# Patient Record
(demographics unavailable — no encounter records)

---
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
## 2017-12-11 NOTE — EMERGENCY DEPARTMENT REPORT
HPI





- General


Chief Complaint: Psych


Time Seen by Provider: 12/10/17 00:27





- HPI


HPI: 


Patient has been cleared by psych.  Psych as rescinded 1013.  Patient is now 

stable from a psychiatric stand point to be discharged home.  Will discharge 

patient home. 








ED Past Medical Hx





- Past Medical History


Previous Medical History?: Yes


Hx Psychiatric Treatment: Yes (Bipolar, Schizophrenia, Anxiety, Depression)


Additional medical history: Sciatica, Gastroparesis





- Surgical History


Hx Cholecystectomy: Yes





- Family History


Family history: no significant





- Social History


Smoking Status: Never Smoker


Substance Use Type: None





- Medications


Home Medications: 


 Home Medications











 Medication  Instructions  Recorded  Confirmed  Last Taken  Type


 


Ambien 10 mg PO HS 12/09/17 12/09/17 Unknown History


 


Lexapro 10 mg PO DAILY 12/09/17 12/09/17 Unknown History


 


Xanax TAB 2 mg PO TID 12/09/17 12/09/17 Unknown History


 


ZyPREXA 10 mg PO DAILY 12/09/17 12/09/17 Unknown History














ED Review of Systems


ROS: 


Stated complaint: MH; N/V


Other details as noted in HPI





Comment: All other systems reviewed and negative


Constitutional: denies: chills, fever


Respiratory: denies: cough, orthopnea, shortness of breath, SOB with exertion


Cardiovascular: denies: chest pain, palpitations, dyspnea on exertion, orthopnea

, edema


Gastrointestinal: denies: abdominal pain, nausea, vomiting, diarrhea, 

constipation, hematemesis


Musculoskeletal: denies: back pain


Skin: denies: rash


Neurological: denies: headache, weakness, numbness, paresthesias, confusion


Psychiatric: depression, suicidal thoughts.  denies: auditory hallucinations, 

visual hallucinations, homicidal thoughts





Physical Exam





- Physical Exam


Vital Signs: 


 Vital Signs











  12/09/17 12/09/17 12/10/17





  22:24 22:54 04:00


 


Temperature 98.9 F 98.9 F 


 


Pulse Rate 65 70 


 


Respiratory 18 16 18





Rate   


 


Blood Pressure 103/65 103/65 


 


Blood Pressure   





[Right]   


 


O2 Sat by Pulse 100 100 98





Oximetry   














  12/10/17 12/10/17 12/10/17





  08:15 08:30 20:15


 


Temperature 98.9 F  


 


Pulse Rate 83  


 


Respiratory 16 16 18





Rate   


 


Blood Pressure   


 


Blood Pressure 101/67  





[Right]   


 


O2 Sat by Pulse 100 100 99





Oximetry   














  12/10/17 12/10/17





  21:50 21:57


 


Temperature  98.7 F


 


Pulse Rate  77


 


Respiratory 16 16





Rate  


 


Blood Pressure  


 


Blood Pressure  96/54





[Right]  


 


O2 Sat by Pulse 99 97





Oximetry  














ED Course


 Vital Signs











  12/09/17 12/09/17 12/10/17





  22:24 22:54 04:00


 


Temperature 98.9 F 98.9 F 


 


Pulse Rate 65 70 


 


Respiratory 18 16 18





Rate   


 


Blood Pressure 103/65 103/65 


 


Blood Pressure   





[Right]   


 


O2 Sat by Pulse 100 100 98





Oximetry   














  12/10/17 12/10/17 12/10/17





  08:15 08:30 20:15


 


Temperature 98.9 F  


 


Pulse Rate 83  


 


Respiratory 16 16 18





Rate   


 


Blood Pressure   


 


Blood Pressure 101/67  





[Right]   


 


O2 Sat by Pulse 100 100 99





Oximetry   














  12/10/17 12/10/17





  21:50 21:57


 


Temperature  98.7 F


 


Pulse Rate  77


 


Respiratory 16 16





Rate  


 


Blood Pressure  


 


Blood Pressure  96/54





[Right]  


 


O2 Sat by Pulse 99 97





Oximetry  














ED Medical Decision Making





- Lab Data


Result diagrams: 


 12/09/17 23:15





 12/09/17 23:15


Critical care attestation.: 


If time is entered above; I have spent that time in minutes in the direct care 

of this critically ill patient, excluding procedure time.








ED Disposition


Clinical Impression: 


 Depression





Disposition: DC-01 TO HOME OR SELFCARE


Is pt being admited?: No


Does the pt Need Aspirin: No


Condition: Stable


Instructions:  Depression (ED)


Additional Instructions: 


Patient sees psychiatry within 3-5 days.  Patient to see PCP in 3-5 days,.  

Patient to return to ER if condition worsens. 


Referrals: 


PRIMARY CARE,MD [Primary Care Provider] - 3-5 Days


Time of Disposition: 01:16

## 2018-01-20 NOTE — EMERGENCY DEPARTMENT REPORT
- General


Chief complaint: Skin/Abscess/Foreign Body


Stated complaint: BOIL


Time Seen by Provider: 01/20/18 10:15


Source: patient


Mode of arrival: Ambulatory


Limitations: No Limitations





- History of Present Illness


Initial comments: 





33-year-old past medical history bipolar disorder, schizophrenia, gastroparesis

, previous cholecystectomy presents also complains of a painful boil to her 

vaginal area for the past 5 days.  Patient was seen at [facility] [place] ago for the 

same and it was the size of a seat at the time.  No antibiotic were prescribed.

  Patient was diagnosed with yeast infection.  Since then pain has continued 

and is described as moderate to severe in intensity, constant, worse palpation 

and sitting.  No relieving factors reported.  Positive fever 103 at home.  

Positive nausea, vomiting or by mouth intolerance for the past 5 days.  No 

complaints of abdominal pain or diarrhea.  Patient also has a complaint of 

intermittent sternal "snatching" chest pain 2 days with mild shortness of 

breath





- Related Data


 Home Medications











 Medication  Instructions  Recorded  Confirmed  Last Taken


 


Ambien 10 mg PO HS 12/09/17 12/09/17 Unknown


 


Lexapro 10 mg PO DAILY 12/09/17 12/09/17 Unknown


 


Xanax TAB 2 mg PO TID 12/09/17 12/09/17 Unknown


 


ZyPREXA 10 mg PO DAILY 12/09/17 12/09/17 Unknown








 Previous Rx's











 Medication  Instructions  Recorded  Last Taken  Type


 


Doxycycline [Vibramycin CAP] 100 mg PO Q12HR #20 capsule 01/20/18 Unknown Rx


 


HYDROcodone/APAP 5-325 [[place] 1 each PO Q6HR PRN #15 tablet 01/20/18 Unknown Rx





5/325]    


 


Promethazine [Phenergan TAB] 25 mg PO Q6HR PRN #20 tab 01/20/18 Unknown Rx











 Allergies











Allergy/AdvReac Type Severity Reaction Status Date / Time


 


ketorolac [From Toradol] Allergy Unknown Unknown Verified 01/20/18 11:25


 


ondansetron Allergy Unknown Unknown Verified 01/20/18 11:26





[From Zofran (as     





hydrochloride)]     














Abscess Boil HPI





- HPI


Chief Complaint: Skin/Abscess/Foreign Body


Stated Complaint: BOIL


Time Seen by Provider: 01/20/18 10:15


Home Medications: 


 Home Medications











 Medication  Instructions  Recorded  Confirmed  Last Taken


 


Ambien 10 mg PO HS 12/09/17 12/09/17 Unknown


 


Lexapro 10 mg PO DAILY 12/09/17 12/09/17 Unknown


 


Xanax TAB 2 mg PO TID 12/09/17 12/09/17 Unknown


 


ZyPREXA 10 mg PO DAILY 12/09/17 12/09/17 Unknown








 Previous Rx's











 Medication  Instructions  Recorded  Last Taken  Type


 


Doxycycline [Vibramycin CAP] 100 mg PO Q12HR #20 capsule 01/20/18 Unknown Rx


 


HYDROcodone/APAP 5-325 [[place] 1 each PO Q6HR PRN #15 tablet 01/20/18 Unknown Rx





5/325]    


 


Promethazine [Phenergan TAB] 25 mg PO Q6HR PRN #20 tab 01/20/18 Unknown Rx











Allergies/Adverse Reactions: 


 Allergies











Allergy/AdvReac Type Severity Reaction Status Date / Time


 


ketorolac [From Toradol] Allergy Unknown Unknown Verified 01/20/18 11:25


 


ondansetron Allergy Unknown Unknown Verified 01/20/18 11:26





[From Zofran (as     





hydrochloride)]     














ED Review of Systems


ROS: 


Stated complaint: BOIL


Other details as noted in HPI





Comment: All other systems reviewed and negative


Other: 





Constitutional: No fevers chills 


Eyes: No eye pain visual changes 


ENT: No ear pain or throat pain


Neck: Denies pain


Respiratory: Denies cough wheezing


Cardiovascular: Denies  palpitations, syncope


GI: As per HPI


: Denies dysuria


Musculoskeletal: Denies back pain, joint swelling 


Skin: Denies rash, lesions, erythema


Neurologic: Denies headache, numbness, weakness


Psychiatric: Denies suicidal ideation, hallucinations








ED Past Medical Hx





- Past Medical History


Hx Psychiatric Treatment: Yes (Bipolar, Schizophrenia, Anxiety, Depression)


Additional medical history: Sciatica, Gastroparesis





- Surgical History


Hx Cholecystectomy: Yes





- Social History


Smoking Status: Never Smoker





- Medications


Home Medications: 


 Home Medications











 Medication  Instructions  Recorded  Confirmed  Last Taken  Type


 


Ambien 10 mg PO HS 12/09/17 12/09/17 Unknown History


 


Lexapro 10 mg PO DAILY 12/09/17 12/09/17 Unknown History


 


Xanax TAB 2 mg PO TID 12/09/17 12/09/17 Unknown History


 


ZyPREXA 10 mg PO DAILY 12/09/17 12/09/17 Unknown History


 


Doxycycline [Vibramycin CAP] 100 mg PO Q12HR #20 capsule 01/20/18  Unknown Rx


 


HYDROcodone/APAP 5-325 [[place] 1 each PO Q6HR PRN #15 tablet 01/20/18  Unknown Rx





5/325]     


 


Promethazine [Phenergan TAB] 25 mg PO Q6HR PRN #20 tab 01/20/18  Unknown Rx














ED Physical Exam





- General


Limitations: No Limitations





- Other


Other exam information: 





General: No limitations, patient is alert in no acute distress


Head exam: Atraumatic, normocephalic


Eyes exam: Normal appearance


ENT: Moist mucous membrane, normal oropharynx


Neck exam: Normal inspection, full range of motion, no meningismus nontender


Respiratory exam: Clear to auscultation bilateral, no wheezes, rales, crackles


Cardiovascular: Normal rate and rhythm, normal heart sounds


Abdomen: Soft, nondistended, left suprapubic tenderness, with normal bowel 

sounds, no rebound, or guarding.  Mild tender inguinal lymphadenopathy


: 2-3 cm right Bartholin's cyst abscess with tenderness and fluctuance


Extremity: Full range of motion normal inspection no deformity


Back: Normal Inspection, full range of motion, no tenderness


Neurologic: Alert, oriented x3, cranial nerves intact, no motor or sensory 

deficit


Psychiatric: normal affect, normal mood


Skin: Warm, dry, intact





ED Course


 Vital Signs











  01/20/18 01/20/18 01/20/18





  03:45 06:44 11:03


 


Temperature 98.0 F  


 


Temperature [   





Post-Procedure]   


 


Temperature [   





Pre-Procedure]   


 


Pulse Rate 75 65 59 L


 


Pulse Rate [   





Post-Procedure]   


 


Pulse Rate [Pre   





-Procedure]   


 


Respiratory 18 16 13





Rate   


 


Respiratory   





Rate [Post-   





Procedure]   


 


Respiratory   





Rate [Pre-   





Procedure]   


 


Blood Pressure   


 


Blood Pressure 84/50 95/58 





[Left]   


 


Blood Pressure   





[Post-Procedure   





]   


 


Blood Pressure   





[Pre-Procedure]   


 


O2 Sat by Pulse   





Oximetry   


 


O2 Sat by Pulse   





Oximetry [Post   





-Procedure]   


 


O2 Sat by Pulse   





Oximetry [Pre-   





Procedure]   














  01/20/18 01/20/18 01/20/18





  11:16 11:20 11:30


 


Temperature   


 


Temperature [  98.2 F 





Post-Procedure]   


 


Temperature [  97.2 F L 





Pre-Procedure]   


 


Pulse Rate 69  68


 


Pulse Rate [  58 L 





Post-Procedure]   


 


Pulse Rate [Pre  80 





-Procedure]   


 


Respiratory 11 L 18 20





Rate   


 


Respiratory  16 





Rate [Post-   





Procedure]   


 


Respiratory  18 





Rate [Pre-   





Procedure]   


 


Blood Pressure 94/62  89/52


 


Blood Pressure   





[Left]   


 


Blood Pressure  104/64 





[Post-Procedure   





]   


 


Blood Pressure  110/72 





[Pre-Procedure]   


 


O2 Sat by Pulse  100 100





Oximetry   


 


O2 Sat by Pulse  100 





Oximetry [Post   





-Procedure]   


 


O2 Sat by Pulse  100 





Oximetry [Pre-   





Procedure]   














  01/20/18 01/20/18 01/20/18





  11:32 11:45 11:46


 


Temperature 97.2 F L  


 


Temperature [   





Post-Procedure]   


 


Temperature [   





Pre-Procedure]   


 


Pulse Rate 80  57 L


 


Pulse Rate [   





Post-Procedure]   


 


Pulse Rate [Pre   





-Procedure]   


 


Respiratory 20 16 13





Rate   


 


Respiratory   





Rate [Post-   





Procedure]   


 


Respiratory   





Rate [Pre-   





Procedure]   


 


Blood Pressure   104/64


 


Blood Pressure 110/72  





[Left]   


 


Blood Pressure   





[Post-Procedure   





]   


 


Blood Pressure   





[Pre-Procedure]   


 


O2 Sat by Pulse 100  100





Oximetry   


 


O2 Sat by Pulse   





Oximetry [Post   





-Procedure]   


 


O2 Sat by Pulse   





Oximetry [Pre-   





Procedure]   














  01/20/18 01/20/18 01/20/18





  12:00 12:16 12:30


 


Temperature   


 


Temperature [   





Post-Procedure]   


 


Temperature [   





Pre-Procedure]   


 


Pulse Rate 53 L 58 L 66


 


Pulse Rate [   





Post-Procedure]   


 


Pulse Rate [Pre   





-Procedure]   


 


Respiratory 14 18 16





Rate   


 


Respiratory   





Rate [Post-   





Procedure]   


 


Respiratory   





Rate [Pre-   





Procedure]   


 


Blood Pressure 97/63 97/63 87/51


 


Blood Pressure   





[Left]   


 


Blood Pressure   





[Post-Procedure   





]   


 


Blood Pressure   





[Pre-Procedure]   


 


O2 Sat by Pulse 100 100 100





Oximetry   


 


O2 Sat by Pulse   





Oximetry [Post   





-Procedure]   


 


O2 Sat by Pulse   





Oximetry [Pre-   





Procedure]   














  01/20/18 01/20/18





  12:46 13:29


 


Temperature  


 


Temperature [  





Post-Procedure]  


 


Temperature [  





Pre-Procedure]  


 


Pulse Rate  60


 


Pulse Rate [  





Post-Procedure]  


 


Pulse Rate [Pre  





-Procedure]  


 


Respiratory  18





Rate  


 


Respiratory  





Rate [Post-  





Procedure]  


 


Respiratory  





Rate [Pre-  





Procedure]  


 


Blood Pressure 86/48 


 


Blood Pressure  94/56





[Left]  


 


Blood Pressure  





[Post-Procedure  





]  


 


Blood Pressure  





[Pre-Procedure]  


 


O2 Sat by Pulse 100 98





Oximetry  


 


O2 Sat by Pulse  





Oximetry [Post  





-Procedure]  


 


O2 Sat by Pulse  





Oximetry [Pre-  





Procedure]  














- Reevaluation(s)


Reevaluation #1: 





01/20/18 13:47


Patient stable





- Consultations


Consultation #1: 





01/20/18 12:00


case d/w Dr KHADRA Lerma obgyn rec cath in 3-5 days and to f/u in office in 1 week. 

rec g/c chlamydia be sent via urine in addition to wound culture





- I & D


  ** Right Vagina


Type of Procedure: Simple


Site: right labia vagina, bartholins abscess


Blade Size: 11


I & D Procedure: betadine prep, sterile drapes applied, sterile dressing applied

, gauze wick placed (word cath)


Progress: 





about 5 ml of purulent and bloody drainage obtained


Culture sent





Procedure start time 11:35 AM


Procedures stop  Time 11:39 AM





- Moderate Sedation


Indications: inciscion and drainage


ASA Class: I


Mallampati Airway Score: 2


Preparation: cardiac monitor applied, pulse oximeter, capnometry used, 

supplemental O2 applied, suction/airway equipment at bedside, IV secured


IV Etomidate Dose (mgs): 10


Complications: none


Interventions: oxygen applied


Patient Tolerated Procedure: well





ED Medical Decision Making





- Lab Data


Result diagrams: 


 01/20/18 05:12





 01/20/18 05:12








 Lab Results











  01/20/18 01/20/18 01/20/18 Range/Units





  05:12 05:12 10:22 


 


WBC  6.7    (4.5-11.0)  K/mm3


 


RBC  3.60 L    (3.65-5.03)  M/mm3


 


Hgb  10.6    (10.1-14.3)  gm/dl


 


Hct  31.9    (30.3-42.9)  %


 


MCV  89    (79-97)  fl


 


MCH  29    (28-32)  pg


 


MCHC  33    (30-34)  %


 


RDW  15.3 H    (13.2-15.2)  %


 


Plt Count  232    (140-440)  K/mm3


 


Lymph % (Auto)  23.6    (13.4-35.0)  %


 


Mono % (Auto)  7.9 H    (0.0-7.3)  %


 


Eos % (Auto)  0.5    (0.0-4.3)  %


 


Baso % (Auto)  0.6    (0.0-1.8)  %


 


Lymph #  1.6    (1.2-5.4)  K/mm3


 


Mono #  0.5    (0.0-0.8)  K/mm3


 


Eos #  0.0    (0.0-0.4)  K/mm3


 


Baso #  0.0    (0.0-0.1)  K/mm3


 


Seg Neutrophils %  67.4    (40.0-70.0)  %


 


Seg Neutrophils #  4.5    (1.8-7.7)  K/mm3


 


Sodium   141   (137-145)  mmol/L


 


Potassium   3.6   (3.6-5.0)  mmol/L


 


Chloride   106.0   ()  mmol/L


 


Carbon Dioxide   23   (22-30)  mmol/L


 


Anion Gap   16   mmol/L


 


BUN   11   (7-17)  mg/dL


 


Creatinine   0.6 L   (0.7-1.2)  mg/dL


 


Estimated GFR   > 60   ml/min


 


BUN/Creatinine Ratio   18   %


 


Glucose   83   ()  mg/dL


 


Calcium   9.0   (8.4-10.2)  mg/dL


 


Troponin T   < 0.010  < 0.010  (0.00-0.029)  ng/mL


 


Urine HCG, Qual     (Negative)  














  01/20/18 Range/Units





  11:10 


 


WBC   (4.5-11.0)  K/mm3


 


RBC   (3.65-5.03)  M/mm3


 


Hgb   (10.1-14.3)  gm/dl


 


Hct   (30.3-42.9)  %


 


MCV   (79-97)  fl


 


MCH   (28-32)  pg


 


MCHC   (30-34)  %


 


RDW   (13.2-15.2)  %


 


Plt Count   (140-440)  K/mm3


 


Lymph % (Auto)   (13.4-35.0)  %


 


Mono % (Auto)   (0.0-7.3)  %


 


Eos % (Auto)   (0.0-4.3)  %


 


Baso % (Auto)   (0.0-1.8)  %


 


Lymph #   (1.2-5.4)  K/mm3


 


Mono #   (0.0-0.8)  K/mm3


 


Eos #   (0.0-0.4)  K/mm3


 


Baso #   (0.0-0.1)  K/mm3


 


Seg Neutrophils %   (40.0-70.0)  %


 


Seg Neutrophils #   (1.8-7.7)  K/mm3


 


Sodium   (137-145)  mmol/L


 


Potassium   (3.6-5.0)  mmol/L


 


Chloride   ()  mmol/L


 


Carbon Dioxide   (22-30)  mmol/L


 


Anion Gap   mmol/L


 


BUN   (7-17)  mg/dL


 


Creatinine   (0.7-1.2)  mg/dL


 


Estimated GFR   ml/min


 


BUN/Creatinine Ratio   %


 


Glucose   ()  mg/dL


 


Calcium   (8.4-10.2)  mg/dL


 


Troponin T   (0.00-0.029)  ng/mL


 


Urine HCG, Qual  Negative  (Negative)  














- Medical Decision Making





Bartholin's abscess


Successful IUD in the ED


Culture pending


Patient received clindamycin IV to cover for MRSA


Rocephin IV to cover for gonorrhea


It would be discharged on doxycycline to cover for MRSA and Chlamydia


Word catheter inserted


GYN consultated


GYN follow-up will be recommended





Hypotension


Patient's baseline is in the 90s to low 100s as per personal history and 

previous medical record review


Stable at the ED treatment


Patient denies dizziness


No tachycardia





- Differential Diagnosis


abscess, sepsis, Bartholin's, vaginitis, gastroparesis


Critical Care Time: No


Critical care attestation.: 


If time is entered above; I have spent that time in minutes in the direct care 

of this critically ill patient, excluding procedure time.








ED Disposition


Clinical Impression: 


 Bartholin's gland abscess





Disposition: DC-01 TO HOME OR SELFCARE


Is pt being admited?: No


Does the pt Need Aspirin: No


Condition: Stable


Instructions:  Bartholin Cyst (ED), Incision and Drainage (ED), Sitz Bath (GEN)


Additional Instructions: 


Take the medication as prescribed.  It is very important that you follow up 

with GYN for further treatment and evaluation.  Please return if symptoms 

worsen as indicated by your discharge instructions.  Perform sitz baths as 

instructed


Prescriptions: 


Doxycycline [Vibramycin CAP] 100 mg PO Q12HR #20 capsule


HYDROcodone/APAP 5-325 [[place] 5/325] 1 each PO Q6HR PRN #15 tablet


 PRN Reason: Pain


Promethazine [Phenergan TAB] 25 mg PO Q6HR PRN #20 tab


 PRN Reason: Nausea


Referrals: 


STEVE LERMA MD [Staff Physician] - 3-5 Days (ob/gyn)


GERALDINE WALTON MD [Staff Physician] - 7-10 days


(primary care doctor


)


CARLY VALENTINE MD [Staff Physician] - 7-10 days


(GI doctor


)


[facility] [Provider Group] - 3-5 Days (primary care clinic)


Time of Disposition: 13:50

## 2018-01-24 NOTE — EMERGENCY DEPARTMENT REPORT
ED Female  HPI





- General


Chief complaint: Skin/Abscess/Foreign Body


Stated complaint: REMOVAL OF DRAINAGE TUBE FROM CYST


Time Seen by Provider: 01/24/18 09:28


Source: patient


Mode of arrival: Ambulatory


Limitations: No Limitations





- History of Present Illness


Initial comments: 


33-year-old female past medical history bipolar schizophrenia Bartholin's 

abscess sciatica gastroparesis presents for removal of Brown catheter placed in 

right labia 3 days ago.  As per patient she was seen and treated for bartholins 

abscess in right labia 3 days ago.  Was instructed to return to the ED for 

removal of Bartholin's Word catheter.  Patient states that she has sensation of 

Brown catheter lodge in her right labia.  Denies any fevers chills nausea or 

vomiting but states that it is painful.  States she has tried to make follow-up 

with OB/GYN but  closest appointment she could receive was one week from now.  

Patient is very anxious about having Brown catheter removed and is requesting to 

be sedated for removal.  Patient is awake alert and oriented 3, patient is 

anxious about removal of catheter.


MD Complaint: other (right word catheter)


Onset/Timing: 3


-: days(s)


Location: labia (right)


Severity: moderate


Severity scale (0 -10): 6


Quality: aching


Consistency: constant


Are you Pregnant Now?: No


Associated Symptoms: vaginal discharge





- Related Data


Sexually active: Yes


 Home Medications











 Medication  Instructions  Recorded  Confirmed  Last Taken


 


Ambien 10 mg PO HS 12/09/17 12/09/17 Unknown


 


Lexapro 10 mg PO DAILY 12/09/17 12/09/17 Unknown


 


Xanax TAB 2 mg PO TID 12/09/17 12/09/17 Unknown


 


ZyPREXA 10 mg PO DAILY 12/09/17 12/09/17 Unknown








 Previous Rx's











 Medication  Instructions  Recorded  Last Taken  Type


 


Doxycycline [Vibramycin CAP] 100 mg PO Q12HR #20 capsule 01/20/18 Unknown Rx


 


HYDROcodone/APAP 5-325 [[place] 1 each PO Q6HR PRN #15 tablet 01/20/18 Unknown Rx





5/325]    


 


Promethazine [Phenergan TAB] 25 mg PO Q6HR PRN #20 tab 01/20/18 Unknown Rx


 


oxyCODONE /ACETAMINOPHEN [Percocet 1 tab PO Q6HR PRN #8 tablet 01/24/18 Unknown 

Rx





5/325]    











 Allergies











Allergy/AdvReac Type Severity Reaction Status Date / Time


 


ketorolac [From Toradol] Allergy Unknown Unknown Verified 01/23/18 22:38


 


ondansetron Allergy Unknown Unknown Verified 01/23/18 22:38





[From Zofran (as     





hydrochloride)]     














ED Review of Systems


ROS: 


Stated complaint: REMOVAL OF DRAINAGE TUBE FROM CYST


Other details as noted in HPI





Constitutional: denies: chills, fever


Eyes: denies: eye pain, eye discharge, vision change


ENT: denies: ear pain, throat pain


Respiratory: denies: cough, shortness of breath, wheezing


Cardiovascular: denies: chest pain, palpitations


Endocrine: no symptoms reported


Gastrointestinal: denies: abdominal pain, nausea, diarrhea


Genitourinary: as per HPI (Bartholin's abscess incised and drained 3 days ago 

right labia).  denies: urgency, dysuria, discharge


Musculoskeletal: denies: back pain, joint swelling, arthralgia


Skin: denies: rash, lesions


Neurological: denies: headache, weakness, paresthesias


Psychiatric: denies: anxiety, depression


Hematological/Lymphatic: denies: easy bleeding, easy bruising





ED Past Medical Hx





- Past Medical History


Hx Psychiatric Treatment: Yes (Bipolar, Schizophrenia, Anxiety, Depression)


Additional medical history: Sciatica, Gastroparesis





- Surgical History


Hx Cholecystectomy: Yes





- Social History


Smoking Status: Never Smoker


Substance Use Type: None





- Medications


Home Medications: 


 Home Medications











 Medication  Instructions  Recorded  Confirmed  Last Taken  Type


 


Ambien 10 mg PO HS 12/09/17 12/09/17 Unknown History


 


Lexapro 10 mg PO DAILY 12/09/17 12/09/17 Unknown History


 


Xanax TAB 2 mg PO TID 12/09/17 12/09/17 Unknown History


 


ZyPREXA 10 mg PO DAILY 12/09/17 12/09/17 Unknown History


 


Doxycycline [Vibramycin CAP] 100 mg PO Q12HR #20 capsule 01/20/18  Unknown Rx


 


HYDROcodone/APAP 5-325 [[place] 1 each PO Q6HR PRN #15 tablet 01/20/18  Unknown Rx





5/325]     


 


Promethazine [Phenergan TAB] 25 mg PO Q6HR PRN #20 tab 01/20/18  Unknown Rx


 


oxyCODONE /ACETAMINOPHEN [Percocet 1 tab PO Q6HR PRN #8 tablet 01/24/18  

Unknown Rx





5/325]     














ED Physical Exam





- General


Limitations: No Limitations


General appearance: alert, in no apparent distress





- Head


Head exam: Present: atraumatic, normocephalic





- Eye


Eye exam: Present: normal appearance, PERRL, EOMI





- ENT


ENT exam: Present: mucous membranes moist





- Neck


Neck exam: Present: normal inspection





- Respiratory


Respiratory exam: Present: normal lung sounds bilaterally.  Absent: respiratory 

distress





- Cardiovascular


Cardiovascular Exam: Present: regular rate, normal rhythm.  Absent: systolic 

murmur, diastolic murmur, rubs, gallop





- GI/Abdominal


GI/Abdominal exam: Present: soft, normal bowel sounds





- 


External exam: Present: other (Word catheter in place right inner labia)


Speculum exam: Present: normal speculum exam


Bi-manual exam: Present: normal bi-manual exam





- Extremities Exam


Extremities exam: Present: normal inspection





- Back Exam


Back exam: Present: normal inspection





- Neurological Exam


Neurological exam: Present: alert, oriented X3, CN II-XII intact, normal gait





- Psychiatric


Psychiatric exam: Present: normal affect, normal mood





- Skin


Skin exam: Present: warm, dry, intact, normal color.  Absent: rash





ED Course


 Vital Signs











  01/23/18 01/24/18 01/24/18





  22:38 06:17 09:12


 


Temperature 98.5 F 98.4 F 98.9 F


 


Pulse Rate 84 60 59 L


 


Respiratory 20 14 15





Rate   


 


Blood Pressure 96/57 96/66 


 


Blood Pressure   109/58





[Right]   


 


O2 Sat by Pulse 100 100 100





Oximetry   














ED Medical Decision Making





- Medical Decision Making


A/P: Word catheter removal


1-successful removal of a Word catheter, I was chaperoned by paramedic Kayy


2-area of Bartholin's abscess does not appear to be fluctuant on palpation 

minimal tenderness


3-I specifically advised patient to follow up with OB/GYN and to return to the 

ED for any reaccumulation of abscess fevers chills nausea vomiting or worsened 

pain.  Patient agreed to these parameters.


4-initial wound culture shows that Bartholin's abscess was infected with MRSA 

which is sensitive to tetracycline's.  I discussed this with Dr. Singh who 

advised me to tell the patient to continue taking doxycycline and finished her 

course of antibiotics. 


Critical care attestation.: 


If time is entered above; I have spent that time in minutes in the direct care 

of this critically ill patient, excluding procedure time.








ED Disposition


Clinical Impression: 


 Urinary catheter insertion/adjustment/removal, Bartholin's gland abscess





Disposition: DC-01 TO HOME OR SELFCARE


Is pt being admited?: No


Does the pt Need Aspirin: No


Condition: Stable


Instructions:  Bartholin Cyst (ED), Incision and Drainage (ED)


Prescriptions: 


oxyCODONE /ACETAMINOPHEN [Percocet 5/325] 1 tab PO Q6HR PRN #8 tablet


 PRN Reason: Pain


Referrals: 


[facility] [Provider Group] - 3-5 Days


[facility] [Provider Group] - 3-5 Days


Forms:  Accompanied Note, Work/School Release Form(ED)


Time of Disposition: 12:21

## 2018-04-11 NOTE — EMERGENCY DEPARTMENT REPORT
ED Abdominal Pain HPI





- General


Chief Complaint: Abdominal Pain


Stated Complaint: FLANK PAIN


Time Seen by Provider: 04/11/18 20:28


Source: patient, EMS


Mode of arrival: Ambulatory


Limitations: No Limitations





- History of Present Illness


Initial Comments: 





Patient is a 33-year-old black female who is presenting with right flank pain.  

Patient states she started having lower pelvic pain for last 3 days.  Patient 

states is now radiating to the right flank.  Patient denies any nausea vomiting 

or dysuria however she does have some urinary frequency.  Patient also denies 

fevers and chills as well.  Patient had a syncopal episode yesterday as well as 

today where she became very dizzy after standing.  Patient states that she is 

most dizzy when her pain is at its worst.  Patient states the pain is crampy in 

nature and is a 10 out of 10.  Patient states that she began to cramp really 

heavily stood up and then passed out on both occasions.





- Related Data


 Previous Rx's











 Medication  Instructions  Recorded  Last Taken  Type


 


HYDROcodone/APAP 5-325 [[place] 1 each PO Q6HR PRN #12 tablet 04/11/18 Unknown Rx





5/325]    


 


Nitrofurantoin Monohyd/M-Cryst 100 mg PO BID #14 capsule 04/11/18 Unknown Rx





[Macrobid 100 mg Capsule]    


 


Promethazine [Phenergan TAB] 25 mg PO Q6HR PRN #10 tab 04/11/18 Unknown Rx











 Allergies











Allergy/AdvReac Type Severity Reaction Status Date / Time


 


ketorolac [From Toradol] Allergy  Unknown Verified 04/11/18 15:39


 


ondansetron Allergy  Unknown Verified 04/11/18 15:38





[From Zofran (as     





hydrochloride)]     














ED Review of Systems


ROS: 


Stated complaint: FLANK PAIN


Other details as noted in HPI





Comment: All other systems reviewed and negative





ED Past Medical Hx





- Past Medical History


Previous Medical History?: Yes


Additional medical history: Abd mass





- Surgical History


Past Surgical History?: Yes


Additional Surgical History: Exp lap





- Social History


Smoking Status: Never Smoker





- Medications


Home Medications: 


 Home Medications











 Medication  Instructions  Recorded  Confirmed  Last Taken  Type


 


HYDROcodone/APAP 5-325 [[place] 1 each PO Q6HR PRN #12 tablet 04/11/18  Unknown Rx





5/325]     


 


Nitrofurantoin Monohyd/M-Cryst 100 mg PO BID #14 capsule 04/11/18  Unknown Rx





[Macrobid 100 mg Capsule]     


 


Promethazine [Phenergan TAB] 25 mg PO Q6HR PRN #10 tab 04/11/18  Unknown Rx














ED Physical Exam





- General


Limitations: No Limitations


General appearance: alert, in no apparent distress





- Head


Head exam: Present: atraumatic, normocephalic





- Eye


Eye exam: Present: normal appearance





- ENT


ENT exam: Present: mucous membranes moist





- Neck


Neck exam: Present: normal inspection





- Respiratory


Respiratory exam: Present: normal lung sounds bilaterally.  Absent: respiratory 

distress, wheezes, rales, rhonchi





- Cardiovascular


Cardiovascular Exam: Present: regular rate, normal rhythm.  Absent: systolic 

murmur, diastolic murmur, rubs, gallop





- GI/Abdominal


GI/Abdominal exam: Present: soft, tenderness (suprapubic and right CVA 

tenderness), normal bowel sounds.  Absent: distended, guarding, rebound





- Extremities Exam


Extremities exam: Present: normal inspection





- Back Exam


Back exam: Present: normal inspection





- Neurological Exam


Neurological exam: Present: alert, oriented X3





- Psychiatric


Psychiatric exam: Present: normal affect, normal mood





- Skin


Skin exam: Present: warm, dry, intact, normal color.  Absent: rash





ED Course


 Vital Signs











  04/11/18





  15:29


 


Temperature 98.1 F


 


Pulse Rate 83


 


Respiratory 18





Rate 


 


Blood Pressure 98/64


 


O2 Sat by Pulse 98





Oximetry 














ED Medical Decision Making





- Lab Data


Result diagrams: 


 04/11/18 15:42





 04/11/18 15:42








 Lab Results











  04/11/18 04/11/18 04/11/18 Range/Units





  15:42 15:42 17:20 


 


WBC  4.0 L    (4.5-11.0)  K/mm3


 


RBC  4.29    (3.65-5.03)  M/mm3


 


Hgb  12.9    (10.1-14.3)  gm/dl


 


Hct  38.0    (30.3-42.9)  %


 


MCV  89    (79-97)  fl


 


MCH  30    (28-32)  pg


 


MCHC  34    (30-34)  %


 


RDW  14.4    (13.2-15.2)  %


 


Plt Count  199    (140-440)  K/mm3


 


Lymph % (Auto)  42.0 H    (13.4-35.0)  %


 


Mono % (Auto)  7.4 H    (0.0-7.3)  %


 


Eos % (Auto)  0.4    (0.0-4.3)  %


 


Baso % (Auto)  1.1    (0.0-1.8)  %


 


Lymph #  1.7    (1.2-5.4)  K/mm3


 


Mono #  0.3    (0.0-0.8)  K/mm3


 


Eos #  0.0    (0.0-0.4)  K/mm3


 


Baso #  0.0    (0.0-0.1)  K/mm3


 


Seg Neutrophils %  49.1    (40.0-70.0)  %


 


Seg Neutrophils #  2.0    (1.8-7.7)  K/mm3


 


Sodium   141   (137-145)  mmol/L


 


Potassium   3.5 L   (3.6-5.0)  mmol/L


 


Chloride   103.2   ()  mmol/L


 


Carbon Dioxide   22   (22-30)  mmol/L


 


Anion Gap   19   mmol/L


 


BUN   6 L   (7-17)  mg/dL


 


Creatinine   0.7   (0.7-1.2)  mg/dL


 


Estimated GFR   > 60   ml/min


 


BUN/Creatinine Ratio   9   %


 


Glucose   82   ()  mg/dL


 


Calcium   9.0   (8.4-10.2)  mg/dL


 


Total Bilirubin   0.50   (0.1-1.2)  mg/dL


 


AST   12   (5-40)  units/L


 


ALT   < 5 L   (7-56)  units/L


 


Alkaline Phosphatase   52   ()  units/L


 


Total Protein   7.3   (6.3-8.2)  g/dL


 


Albumin   4.3   (3.9-5)  g/dL


 


Albumin/Globulin Ratio   1.4   %


 


Lipase   31   (13-60)  units/L


 


Urine Color    Red  (Yellow)  


 


Urine Turbidity    Clear  (Clear)  


 


Urine pH    6.0  (5.0-7.0)  


 


Ur Specific Gravity    1.017  (1.003-1.030)  


 


Urine Protein    100 mg/dl  (Negative)  mg/dL


 


Urine Glucose (UA)    Neg  (Negative)  mg/dL


 


Urine Ketones    Neg  (Negative)  mg/dL


 


Urine Blood    Lg  (Negative)  


 


Urine Nitrite    Neg  (Negative)  


 


Urine Bilirubin    Neg  (Negative)  


 


Urine Urobilinogen    < 2.0  (<2.0)  mg/dL


 


Ur Leukocyte Esterase    Tr  (Negative)  


 


Urine WBC (Auto)    101.0 H  (0.0-6.0)  /HPF


 


Urine RBC (Auto)    > 182.0  (0.0-6.0)  /HPF


 


U Epithel Cells (Auto)    9.0  (0-13.0)  /HPF


 


Urine Bacteria (Auto)    1+  (Negative)  /HPF


 


Urine Mucus    2+  /HPF














- Radiology Data


Radiology results: report reviewed





CT abdomen and pelvis shows no obstructive uropathy present.





- Medical Decision Making





Patient was to be discharged home with antibiotics pain meds and nausea 

medicines however when I went to the patient's room to give her her results of 

her CT patient was not there.  Patient had disconnected herself from her IV 

fluids and taken off her gown) his left.  Patient has left AGAINST MEDICAL 

ADVICE without signing.  Nurses staff will attempt to contact the patient to 

return for antibiotics..  Patient's prescription for Macrobid and Zofran will 

be left with nursing staff in case the patient does return.


Critical care attestation.: 


If time is entered above; I have spent that time in minutes in the direct care 

of this critically ill patient, excluding procedure time.








ED Disposition


Clinical Impression: 


Acute cystitis


Qualifiers:


 Hematuria presence: with hematuria Qualified Code(s): N30.01 - Acute cystitis 

with hematuria





Disposition: DC-07 LEFT AGAINST MED ADVICE


Is pt being admited?: No


Does the pt Need Aspirin: No


Condition: Stable


Instructions:  Urinary Tract Infection in Women (ED)


Prescriptions: 


HYDROcodone/APAP 5-325 [[place] 5/325] 1 each PO Q6HR PRN #12 tablet


 PRN Reason: Pain


Nitrofurantoin Monohyd/M-Cryst [Macrobid 100 mg Capsule] 100 mg PO BID #14 

capsule


Promethazine [Phenergan TAB] 25 mg PO Q6HR PRN #10 tab


 PRN Reason: Nausea


Referrals: 


GERALDINE WALTON MD [Primary Care Provider] - 3-5 Days

## 2018-04-11 NOTE — CAT SCAN REPORT
FINAL REPORT



EXAM:  CT ABDOMEN PELVIS WO CON



HISTORY:  right flank pain 



TECHNIQUE:  Spiral CT scanning of the abdomen and pelvis. No oral

or IV contrast administered. Multiplanar reformations. 



PRIORS:  None.



FINDINGS:  

Abdomen: 



Visualized lung bases grossly unremarkable. 



Solid organ evaluation is limited due to lack of IV contrast. 



The kidneys are grossly unremarkable.  



No intrarenal calculi, significant hydronephrosis or abnormal

perinephric fluid collection. 



Gallbladder surgically absent. 



Remainder of visualized abdominal parenchyma grossly

unremarkable.  



Pelvis:  



Probable phlebolith projects in the right lower pelvis. No

appreciable calcifications or significant dilatation in the

distal ureters. 



Bowel grossly unremarkable. 



Appendix within normal limits. 



No significant free peritoneal fluid or loculated fluid

collection. 



Abdominal aorta non-aneurysmal. 







IMPRESSION:  

1. No evidence of urolithiasis or signficant hydronephrosis.

## 2018-08-28 NOTE — EMERGENCY DEPARTMENT REPORT
ED ENT HPI





- General


Chief complaint: Dental/Oral


Stated complaint: TOOTH PAIN


Time Seen by Provider: 08/28/18 09:41


Source: patient


Mode of arrival: Ambulatory


Limitations: No Limitations





- History of Present Illness


Initial comments: 





This is a 34-year-old female here report that she has toothache to her left 

lower back to that started 3 days ago.  She said the filling fell out and her 

dentist said that she has to have the tooth removed and they do not have an 

appointment for the next 2 weeks.  Pain is 10/10 and a QRS with eating and no 

alleviating factors.  Patient states that she did take an ibuprofen over-the-

counter and BC powder with no relief.  Denies any fever or chills.  Denies any 

respiratory symptoms.


MD complaint: tooth pain


Onset/Timing: 3


-: days(s)


Location: tooth # (left lower back tooth)


Severity: severe


Severity scale (0 -10): 10


Quality: aching


Consistency: constant


Improves with: none


Worsens with: eating


Context- Dental: poor dental care


Associated Symptoms: denies: fever, cough, gum swelling, toothache, pain with 

swallowing, sore throat, tinnitus, hearing loss, discharge from ear, rhinorrhea





- Related Data


 Previous Rx's











 Medication  Instructions  Recorded  Last Taken  Type


 


HYDROcodone/APAP 5-325 [[place] 1 each PO Q6HR PRN #12 tablet 04/11/18 Unknown Rx





5/325]    


 


Nitrofurantoin Monohyd/M-Cryst 100 mg PO BID #14 capsule 04/11/18 Unknown Rx





[Macrobid 100 mg Capsule]    


 


Promethazine [Phenergan TAB] 25 mg PO Q6HR PRN #10 tab 04/11/18 Unknown Rx


 


Clindamycin [Clindamycin CAP] 300 mg PO Q8H 10 Days #30 cap 08/28/18 Unknown Rx


 


Ibuprofen [Motrin] 800 mg PO Q8HR PRN #15 tablet 08/28/18 Unknown Rx


 


traMADol [Ultram 50 MG tab] 50 mg PO Q6HR PRN #20 tablet 08/28/18 Unknown Rx











 Allergies











Allergy/AdvReac Type Severity Reaction Status Date / Time


 


ketorolac [From Toradol] Allergy  Unknown Verified 08/28/18 08:26


 


ondansetron Allergy  Unknown Verified 08/28/18 08:26





[From Zofran (as     





hydrochloride)]     














ED Dental HPI





- General


Chief complaint: Dental/Oral


Stated complaint: TOOTH PAIN


Time Seen by Provider: 08/28/18 09:41


Source: patient


Mode of arrival: Ambulatory


Limitations: No Limitations





- Related Data


 Previous Rx's











 Medication  Instructions  Recorded  Last Taken  Type


 


HYDROcodone/APAP 5-325 [[place] 1 each PO Q6HR PRN #12 tablet 04/11/18 Unknown Rx





5/325]    


 


Nitrofurantoin Monohyd/M-Cryst 100 mg PO BID #14 capsule 04/11/18 Unknown Rx





[Macrobid 100 mg Capsule]    


 


Promethazine [Phenergan TAB] 25 mg PO Q6HR PRN #10 tab 04/11/18 Unknown Rx


 


Clindamycin [Clindamycin CAP] 300 mg PO Q8H 10 Days #30 cap 08/28/18 Unknown Rx


 


Ibuprofen [Motrin] 800 mg PO Q8HR PRN #15 tablet 08/28/18 Unknown Rx


 


traMADol [Ultram 50 MG tab] 50 mg PO Q6HR PRN #20 tablet 08/28/18 Unknown Rx











 Allergies











Allergy/AdvReac Type Severity Reaction Status Date / Time


 


ketorolac [From Toradol] Allergy  Unknown Verified 08/28/18 08:26


 


ondansetron Allergy  Unknown Verified 08/28/18 08:26





[From Zofran (as     





hydrochloride)]     














ED Review of Systems


ROS: 


Stated complaint: TOOTH PAIN


Other details as noted in HPI





Comment: All other systems reviewed and negative


Constitutional: denies: chills, fever


ENT: dental pain.  denies: ear pain, throat pain, congestion


Respiratory: denies: cough, shortness of breath, SOB with exertion, wheezing


Cardiovascular: denies: chest pain, palpitations, dyspnea on exertion, edema, 

syncope, paroxysmal nocturnal dyspnea


Musculoskeletal: denies: back pain, joint swelling, arthralgia, myalgia


Skin: denies: rash


Neurological: denies: headache, numbness, paresthesias, confusion, abnormal gait





ED Past Medical Hx





- Past Medical History


Previous Medical History?: Yes


Additional medical history: Abd mass





- Surgical History


Past Surgical History?: Yes


Hx Cholecystectomy: Yes


Additional Surgical History: Exp lap, c/s





- Family History


Family history: hypertension





- Social History


Smoking Status: Never Smoker


Substance Use Type: None





- Medications


Home Medications: 


 Home Medications











 Medication  Instructions  Recorded  Confirmed  Last Taken  Type


 


HYDROcodone/APAP 5-325 [[place] 1 each PO Q6HR PRN #12 tablet 04/11/18  Unknown Rx





5/325]     


 


Nitrofurantoin Monohyd/M-Cryst 100 mg PO BID #14 capsule 04/11/18  Unknown Rx





[Macrobid 100 mg Capsule]     


 


Promethazine [Phenergan TAB] 25 mg PO Q6HR PRN #10 tab 04/11/18  Unknown Rx


 


Clindamycin [Clindamycin CAP] 300 mg PO Q8H 10 Days #30 cap 08/28/18  Unknown Rx


 


Ibuprofen [Motrin] 800 mg PO Q8HR PRN #15 tablet 08/28/18  Unknown Rx


 


traMADol [Ultram 50 MG tab] 50 mg PO Q6HR PRN #20 tablet 08/28/18  Unknown Rx














ED Physical Exam





- General


Limitations: No Limitations


General appearance: alert, in no apparent distress





- Head


Head exam: Present: atraumatic, normocephalic, normal inspection





- Eye


Eye exam: Present: normal appearance, PERRL, EOMI


Pupils: Present: normal accommodation





- ENT


ENT exam: Present: normal orophraynx, mucous membranes moist, TM's normal 

bilaterally, normal external ear exam.  Absent: normal exam





- Expanded ENT Exam


  ** Expanded


Ear exam: Present: normal external inspection


Mouth exam: Present: normal external inspection, tongue normal


Teeth exam: Present: dental caries, dental tenderness #





  __________________________














  __________________________





 1 - Dental Tenderness (#17), Other (dental caries and missing filling)





Throat exam: Positive: normal inspection





- Neck


Neck exam: Present: normal inspection, tenderness, full ROM.  Absent: 

meningismus, lymphadenopathy





- Respiratory


Respiratory exam: Present: normal lung sounds bilaterally.  Absent: respiratory 

distress, chest wall tenderness





- Cardiovascular


Cardiovascular Exam: Present: regular rate, normal rhythm, normal heart sounds.

  Absent: systolic murmur, diastolic murmur





- GI/Abdominal


GI/Abdominal exam: Present: normal bowel sounds





- Extremities Exam


Extremities exam: Present: normal inspection, other (No cce. + 2 pulses in all 

extremities, no neurovascular compromise), full ROM, normal capillary refill.  

Absent: tenderness, pedal edema, joint swelling, calf tenderness





- Back Exam


Back exam: Present: normal inspection





- Neurological Exam


Neurological exam: Present: alert, reflexes normal, oriented X3





- Psychiatric


Psychiatric exam: Present: normal affect, normal mood





- Skin


Skin exam: Present: warm, dry, intact, normal color.  Absent: rash





ED Course


 Vital Signs











  08/28/18





  08:26


 


Temperature 99 F


 


Pulse Rate 64


 


Respiratory 18





Rate 


 


Blood Pressure 94/65


 


O2 Sat by Pulse 99





Oximetry 














- Reevaluation(s)


Reevaluation #1: 





08/28/18 09:55


Patient given tramadol 50 mg by mouth for her toothache and clindamycin 600 mg 

by mouth for tooth infection.





ED Medical Decision Making





- Medical Decision Making





This is a 34-year-old female here reports that she is having in toothache for 3 

days and she does have a dentist but dentist does not have an appointment for 

the next 2 weeks.  She says she is having pain 10/10 and she is taking Goody 

powder and ibuprofen but is not helping.





Patient was seen and examined by myself and physical exam is normal except 

mouth moist, no pharyngeal exudate or erythema that she does have dental caries 

and hole noted to tooth #17 with tenderness around tooth #17.  She does have 

braces in.  Uvula is midline and oral airways patent.  No trismus or drooling.  

I discussed the patient that she has to follow-up with a dentist to fix 

underlying problem either to remove her to us or to refill #17 and she told me 

that she cannot see them and so the next 2 weeks I told her I will give her 

referral to [facility] dental clinic to call today to schedule an 

appointment.  Patient is stable, vital signs stable she is afebrile she was 

given an clindamycin 600 mg by mouth for infection and tramadol 50 mg by mouth 

for pain.  Pains better.  Patient discharged home to follow up with dentist in 2

-3 days and was given prescription for clindamycin, Motrin and tramadol.


Critical care attestation.: 


If time is entered above; I have spent that time in minutes in the direct care 

of this critically ill patient, excluding procedure time.








ED Disposition


Clinical Impression: 


 Dental caries, Tooth ache, Loss of filling from access hole of tooth





Disposition: DC-01 TO HOME OR SELFCARE


Is pt being admited?: No


Does the pt Need Aspirin: No


Condition: Stable


Instructions:  Dental Caries (ED), Toothache (ED)


Additional Instructions: 


Take antibiotics as prescribed


Gargle with Listerine mouthwash 3 times a day


Follow up with Dentist  as instructed and if he cannot get in with your own 

dentist he can follow-up at  [facility]


Follow up  Primary care physician in 2 days


Take Ultram for pain but please do not drive or operate heavy machinery while 

taking this medication as it causes drowsiness.


If his symptoms worsen, return to the emergency room





Referrals: 


PRIMARY CARE,MD [Primary Care Provider] - 2-3 Days


[facility] [Outside] - 2-3 Days


Forms:  Work/School Release Form(ED)

## 2019-11-19 NOTE — EMERGENCY DEPARTMENT REPORT
ED General Adult HPI





- General


Chief complaint: Dizziness


Stated complaint: DIZZINESS/FAINTING SPELLS


Time Seen by Provider: 19 22:29


Source: patient


Mode of arrival: Ambulatory


Limitations: No Limitations





- History of Present Illness


Initial comments: 





This is a 35-year-old -American female who presents to the emergency room

with nausea, vomiting, and abdominal cramping since yesterday.  Past medical 

history of gastroparesis.  Patient states she the restaurant yesterday around 

1400 shortly after she started vomiting.  Patient also reports one syncopal 

episode yesterday that was unwitnessed.  States she checked her blood pressure 

was 80/60's.  Reports vomiting 5 times this p.m. in the emergency room.  Denies 

chest pain, palpitations, weakness, cough, fever, chills, or diarrhea.


Onset/Timin


-: days(s)


Location: abdomen


Radiation: non-radiation


Severity scale (0 -10): 2


Quality: other (cramping)


Consistency: intermittent


Improves with: none


Worsens with: none


Associated Symptoms: nausea/vomiting.  denies: confusion, chest pain, cough, 

diaphoresis, fever/chills, headaches, loss of appetite, malaise, rash, seizure, 

shortness of breath, syncope, weakness


Treatments Prior to Arrival: none





- Related Data


                                  Previous Rx's











 Medication  Instructions  Recorded  Last Taken  Type


 


HYDROcodone/APAP 5-325 [[place] 1 each PO Q6HR PRN #12 tablet 18 Unknown Rx





5/325]    


 


Nitrofurantoin Monohyd/M-Cryst 100 mg PO BID #14 capsule 18 Unknown Rx





[Macrobid 100 mg Capsule]    


 


Promethazine [Phenergan TAB] 25 mg PO Q6HR PRN #10 tab 18 Unknown Rx


 


Clindamycin [Clindamycin CAP] 300 mg PO Q8H 10 Days #30 cap 18 Unknown Rx


 


Ibuprofen [Motrin] 800 mg PO Q8HR PRN #15 tablet 18 Unknown Rx


 


traMADoL [Ultram 50 MG tab] 50 mg PO Q6HR PRN #20 tablet 18 Unknown Rx











                                    Allergies











Allergy/AdvReac Type Severity Reaction Status Date / Time


 


ketorolac [From Toradol] Allergy  Unknown Verified 18 08:26


 


ondansetron Allergy  Unknown Verified 18 08:26





[From Zofran (as     





hydrochloride)]     














ED Review of Systems


ROS: 


Stated complaint: DIZZINESS/FAINTING SPELLS


Other details as noted in HPI





Constitutional: denies: chills, fever


Respiratory: denies: cough, shortness of breath, wheezing


Cardiovascular: denies: chest pain, palpitations


Gastrointestinal: abdominal pain, nausea, vomiting.  denies: diarrhea


Musculoskeletal: denies: back pain, joint swelling, arthralgia


Skin: denies: rash, lesions


Neurological: denies: headache, weakness, paresthesias


Psychiatric: denies: anxiety, depression





ED Past Medical Hx





- Past Medical History


Previous Medical History?: Yes


Additional medical history: gastroparesis





- Surgical History


Hx Cholecystectomy: Yes


Additional Surgical History: Exp lap, c/s





- Social History


Smoking Status: Never Smoker


Substance Use Type: Marijuana





- Medications


Home Medications: 


                                Home Medications











 Medication  Instructions  Recorded  Confirmed  Last Taken  Type


 


HYDROcodone/APAP 5-325 [[place] 1 each PO Q6HR PRN #12 tablet 18  Unknown Rx





5/325]     


 


Nitrofurantoin Monohyd/M-Cryst 100 mg PO BID #14 capsule 18  Unknown Rx





[Macrobid 100 mg Capsule]     


 


Promethazine [Phenergan TAB] 25 mg PO Q6HR PRN #10 tab 18  Unknown Rx


 


Clindamycin [Clindamycin CAP] 300 mg PO Q8H 10 Days #30 cap 18  Unknown Rx


 


Ibuprofen [Motrin] 800 mg PO Q8HR PRN #15 tablet 18  Unknown Rx


 


traMADoL [Ultram 50 MG tab] 50 mg PO Q6HR PRN #20 tablet 18  Unknown Rx














ED Physical Exam





- General


Limitations: No Limitations


General appearance: alert, in no apparent distress





- Respiratory


Respiratory exam: Present: normal lung sounds bilaterally.  Absent: respiratory 

distress





- Cardiovascular


Cardiovascular Exam: Present: regular rate, normal rhythm.  Absent: systolic 

murmur, diastolic murmur, rubs, gallop





- GI/Abdominal


GI/Abdominal exam: Present: soft, normal bowel sounds.  Absent: distended, 

tenderness, guarding, rebound, rigid





- Neurological Exam


Neurological exam: Present: alert, oriented X3, normal gait





- Psychiatric


Psychiatric exam: Present: normal affect, normal mood





- Skin


Skin exam: Present: warm, dry, intact, normal color.  Absent: rash





ED Course


                                   Vital Signs











  19





  20:03


 


Temperature 98.2 F


 


Pulse Rate 91 H


 


Respiratory 18





Rate 


 


Blood Pressure 93/57


 


O2 Sat by Pulse 97





Oximetry 














ED Medical Decision Making





- Lab Data


Result diagrams: 


                                 19 23:51





                                 19 23:51








                                   Lab Results











  19 Range/Units





  23:25 23:51 23:51 


 


WBC   6.5   (4.5-11.0)  K/mm3


 


RBC   4.41   (3.65-5.03)  M/mm3


 


Hgb   13.5   (10.1-14.3)  gm/dl


 


Hct   39.7   (30.3-42.9)  %


 


MCV   90   (79-97)  fl


 


MCH   31   (28-32)  pg


 


MCHC   34   (30-34)  %


 


RDW   15.1   (13.2-15.2)  %


 


Plt Count   230   (140-440)  K/mm3


 


Lymph % (Auto)   19.6   (13.4-35.0)  %


 


Mono % (Auto)   5.2   (0.0-7.3)  %


 


Eos % (Auto)   0.3   (0.0-4.3)  %


 


Baso % (Auto)   0.9   (0.0-1.8)  %


 


Lymph #   1.3   (1.2-5.4)  K/mm3


 


Mono #   0.3   (0.0-0.8)  K/mm3


 


Eos #   0.0   (0.0-0.4)  K/mm3


 


Baso #   0.1   (0.0-0.1)  K/mm3


 


Seg Neutrophils %   74.0 H   (40.0-70.0)  %


 


Seg Neutrophils #   4.8   (1.8-7.7)  K/mm3


 


Sodium    143  (137-145)  mmol/L


 


Potassium    3.5 L  (3.6-5.0)  mmol/L


 


Chloride    105.8  ()  mmol/L


 


Carbon Dioxide    19 L  (22-30)  mmol/L


 


Anion Gap    22  mmol/L


 


BUN    9  (7-17)  mg/dL


 


Creatinine    0.7  (0.7-1.2)  mg/dL


 


Estimated GFR    > 60  ml/min


 


BUN/Creatinine Ratio    13  %


 


Glucose    85  ()  mg/dL


 


Calcium    9.9  (8.4-10.2)  mg/dL


 


Total Bilirubin    0.70  (0.1-1.2)  mg/dL


 


AST    14  (5-40)  units/L


 


ALT    5 L  (7-56)  units/L


 


Alkaline Phosphatase    73  ()  units/L


 


Total Protein    8.1  (6.3-8.2)  g/dL


 


Albumin    4.9  (3.9-5)  g/dL


 


Albumin/Globulin Ratio    1.5  %


 


Urine Color  Yellow    (Yellow)  


 


Urine Turbidity  Clear    (Clear)  


 


Urine pH  5.0    (5.0-7.0)  


 


Ur Specific Gravity  1.016    (1.003-1.030)  


 


Urine Protein  <15 mg/dl    (Negative)  mg/dL


 


Urine Glucose (UA)  Neg    (Negative)  mg/dL


 


Urine Ketones  80    (Negative)  mg/dL


 


Urine Blood  Mod    (Negative)  


 


Urine Nitrite  Neg    (Negative)  


 


Urine Bilirubin  Neg    (Negative)  


 


Urine Urobilinogen  < 2.0    (<2.0)  mg/dL


 


Ur Leukocyte Esterase  Neg    (Negative)  


 


Urine WBC (Auto)  1.0    (0.0-6.0)  /HPF


 


Urine RBC (Auto)  3.0    (0.0-6.0)  /HPF


 


U Epithel Cells (Auto)  5.0    (0-13.0)  /HPF


 


Urine Mucus  2+    /HPF


 


Urine HCG, Qual  Negative    (Negative)  














- Medical Decision Making





Patient was examined by me.  Patient is nontoxic appearing and stable.  Vitals 

are normal.  Obtained labs.  Hypokalemia ordered Klor-Con 40 mEq.  All other 

labs unremarkable.  Given Reglan while in the ER.  Past medical history of 

gastroparesis.  Patient states she also wants to control nausea.  Attempt IV 

access and unable by nursing staff.  Given by mouth challenge and tolerated.  

Nursing staff informed patient was not a room when they attempt to give 

potassium.  Patient left AGAINST MEDICAL ADVICE.


Critical care attestation.: 


If time is entered above; I have spent that time in minutes in the direct care 

of this critically ill patient, excluding procedure time.








ED Disposition


Clinical Impression: 


 Left against medical advice





Disposition: DC-[id number] LEFT AGAINST MED ADVICE


Is pt being admited?: No


Condition: Stable


Referrals: 


PRIMARY CARE,MD [Primary Care Provider] - 3-5 Days